# Patient Record
Sex: MALE | Race: WHITE
[De-identification: names, ages, dates, MRNs, and addresses within clinical notes are randomized per-mention and may not be internally consistent; named-entity substitution may affect disease eponyms.]

---

## 2017-05-26 ENCOUNTER — HOSPITAL ENCOUNTER (EMERGENCY)
Dept: HOSPITAL 76 - ED | Age: 47
Discharge: HOME | End: 2017-05-26
Payer: MEDICAID

## 2017-05-26 VITALS — SYSTOLIC BLOOD PRESSURE: 130 MMHG | DIASTOLIC BLOOD PRESSURE: 81 MMHG

## 2017-05-26 DIAGNOSIS — S82.62XA: Primary | ICD-10-CM
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
## 2017-05-26 PROCEDURE — 73610 X-RAY EXAM OF ANKLE: CPT

## 2017-05-26 PROCEDURE — 99283 EMERGENCY DEPT VISIT LOW MDM: CPT

## 2017-05-26 PROCEDURE — 29505 APPLICATION LONG LEG SPLINT: CPT

## 2017-05-26 NOTE — XRAY REPORT
EXAM:

LEFT ANKLE RADIOGRAPHY

 

EXAM DATE: 5/26/2017 08:03 PM.

 

CLINICAL HISTORY: Lateral pain and edema after a fall.

 

COMPARISON: None.

 

TECHNIQUE: 3 views.

 

FINDINGS: 

Bones: Acute transverse fracture 5 mm superior to the inferior most aspect of the lateral malleolus w
ith 2 mm separation of the fracture fragments.

 

Joints: Moderate ankle effusion. Ankle mortise intact.

 

However, there appears to be asymmetric widening of the subtalar joint, greatest on the right side. H
owever, no appreciable adjacent edema to such.

Large osteophyte off the calcaneal insertion plantar fascia.

 

Soft Tissues: Moderate edema over the lateral malleolar flake fracture.

 

IMPRESSION: 

1. Lateral malleolar flake fracture. 

2. Moderate ankle effusion. 

3. Possible diastases of the medial subtalar joint, correlate clinically.

 

[facility]

Referring Provider Line: [phone]

 

SITE ID: [id number]

## 2017-05-26 NOTE — ED PHYSICIAN DOCUMENTATION
PD HPI LOWER EXT INJURY





- Stated complaint


Stated Complaint: L ANKLE INJURY





- Chief complaint


Chief Complaint: Ext Problem





- History obtained from


History obtained from: Patient





- History of Present Illness


PD HPI LOW EXT INJURY LOCATION: Left, Ankle


Type of injury: Twist


Where injury occurred: Home


Timing - onset: Enter  time (17:00), Today


Timing - details: Abrupt onset


Pain level now: 6


Improved by: Rest


Worsened by: Moving, Palpating


Associated symptoms: Swelling.  No: Weakness, Numbness, Tingling


Similar symptoms before: Has not had sx before


Recently seen: Not recently seen





- Additional information


Additional information: 





while doing yardwork this afternoon, patient stepped into a hole, twisted left 

ankle, heard and felt "popping" and had sudden onset left ankle pain, lateral 

aspect.





Review of Systems


Musculoskeletal: reports: Joint pain, Joint swelling, Pain with weight bearing


Neurologic: denies: Focal weakness, Numbness





PD PAST MEDICAL HISTORY





- Past Medical History


Past Medical History: No





- Past Surgical History


Past Surgical History: No





- Present Medications


Home Medications: 


 Ambulatory Orders











 Medication  Instructions  Recorded  Confirmed


 


Hydrocodone/Acetaminophen 1 - 2 each PO Q6HR PRN #14 tablet 05/26/17 





[Hydrocodon-Acetaminophen 5-325]   














- Allergies


Allergies/Adverse Reactions: 


 Allergies











Allergy/AdvReac Type Severity Reaction Status Date / Time


 


No Known Drug Allergies Allergy   Verified 05/26/17 19:28














- Social History


Does the pt smoke?: No


Smoking Status: Never smoker


Does the pt drink ETOH?: No


Does the pt have substance abuse?: No





- Immunizations


Immunizations are current?: Yes





- POLST


Patient has POLST: No





PD ED PE NORMAL





- Vitals


Vital signs reviewed: Yes





- General


General: Alert and oriented X 3, No acute distress (NAD at rest, appears to be 

in pain with movement/palpation of left ankle), Well developed/nourished





- Derm


Derm: Normal color, Warm and dry





- Neuro


Neuro: No motor deficit, No sensory deficit





PD ED PE EXPANDED





- Extremities


Extremities: Tenderness, Limited ROM, Swelling, Left ankle (lateral malleolus), 

Other (2+ DP/PT pulses (left), NVI distally )





Results





- Vitals


Vitals: 


 Vital Signs - 24 hr











  05/26/17





  19:24


 


Temperature 36.4 C L


 


Heart Rate 60


 


Respiratory 17





Rate 


 


Blood Pressure 130/81 H


 


O2 Saturation 96








 Oxygen











O2 Source                      Room air

















- Rads (name of study)


  ** xrays left ankle


Radiology: Prelim report reviewed, See rad report





Procedures





- Splint (location)


  ** Lower extremity left


Splint applied by: Tech


Type of splint: Fiberglass, Long leg, Posterior, Stirrup


Other: Patient tolerated well, No complications, Neurovascular intact, Good 

alignment, Crutches provided





PD MEDICAL DECISION MAKING





- ED course


Complexity details: reviewed results, re-evaluated patient, considered 

differential, d/w patient





Departure





- Departure


Disposition: 01 Home, Self Care


Clinical Impression: 


 Ankle fracture, left


Condition: Good


Instructions:  ED Crutch Walking, ED Splint Care Fiberglass, ED Fx Ankle 

Lateral Malleolus


Follow-Up: 


Tam Goodman MD [Provider Admit Priv/Credential] -  (Call to arrange for next 

available appointment)


Prescriptions: 


Hydrocodone/Acetaminophen [Hydrocodon-Acetaminophen 5-325] 1 - 2 each PO Q6HR 

PRN #14 tablet


 PRN Reason: Pain


Discharge Date/Time: 05/26/17 21:41

## 2017-05-26 NOTE — XRAY PRELIMINARY REPORT
Accession: [id number]

Exam: XR Ankle 3 View LT

 

IMPRESSION: 

1. Lateral malleolar flake fracture. 

2. Moderate ankle effusion. 

3. Possible diastases of the medial subtalar joint, correlate clinically.

 

[facility]

 

SITE ID: [id number]

## 2020-02-17 ENCOUNTER — HOSPITAL ENCOUNTER (OUTPATIENT)
Dept: HOSPITAL 76 - LAB.R | Age: 50
Discharge: HOME | End: 2020-02-17
Attending: PHYSICIAN ASSISTANT
Payer: MEDICAID

## 2020-02-17 DIAGNOSIS — Z00.00: Primary | ICD-10-CM

## 2020-02-17 LAB — T VAGINALIS RRNA GENITAL QL PROBE: NEGATIVE

## 2020-02-17 PROCEDURE — 87591 N.GONORRHOEAE DNA AMP PROB: CPT

## 2020-02-17 PROCEDURE — 87661 TRICHOMONAS VAGINALIS AMPLIF: CPT

## 2020-02-17 PROCEDURE — 87491 CHLMYD TRACH DNA AMP PROBE: CPT

## 2020-12-08 ENCOUNTER — HOSPITAL ENCOUNTER (OUTPATIENT)
Dept: HOSPITAL 76 - LAB.R | Age: 50
Discharge: HOME | End: 2020-12-08
Attending: PHYSICIAN ASSISTANT
Payer: MEDICAID

## 2020-12-08 DIAGNOSIS — Z20.828: ICD-10-CM

## 2020-12-08 DIAGNOSIS — R52: Primary | ICD-10-CM

## 2022-09-14 ENCOUNTER — HOSPITAL ENCOUNTER (OUTPATIENT)
Dept: HOSPITAL 76 - LAB.S | Age: 52
Discharge: HOME | End: 2022-09-14
Attending: PHYSICIAN ASSISTANT
Payer: MEDICAID

## 2022-09-14 DIAGNOSIS — Z00.00: Primary | ICD-10-CM

## 2022-09-14 DIAGNOSIS — Z12.5: ICD-10-CM

## 2022-09-14 LAB
ALBUMIN DIAFP-MCNC: 4.2 G/DL (ref 3.2–5.5)
ALBUMIN/GLOB SERPL: 1.6 {RATIO} (ref 1–2.2)
ALP SERPL-CCNC: 16 IU/L (ref 42–121)
ALT SERPL W P-5'-P-CCNC: 23 IU/L (ref 10–60)
ANION GAP SERPL CALCULATED.4IONS-SCNC: 7 MMOL/L (ref 6–13)
AST SERPL W P-5'-P-CCNC: 18 IU/L (ref 10–42)
BASOPHILS NFR BLD AUTO: 0.1 10^3/UL (ref 0–0.1)
BASOPHILS NFR BLD AUTO: 0.7 %
BILIRUB BLD-MCNC: 1.6 MG/DL (ref 0.2–1)
BUN SERPL-MCNC: 18 MG/DL (ref 6–20)
CALCIUM UR-MCNC: 9.5 MG/DL (ref 8.5–10.3)
CHLORIDE SERPL-SCNC: 101 MMOL/L (ref 101–111)
CHOLEST SERPL-MCNC: 225 MG/DL
CO2 SERPL-SCNC: 29 MMOL/L (ref 21–32)
CREAT SERPLBLD-SCNC: 0.9 MG/DL (ref 0.6–1.2)
EOSINOPHIL # BLD AUTO: 0.2 10^3/UL (ref 0–0.7)
EOSINOPHIL NFR BLD AUTO: 1.8 %
ERYTHROCYTE [DISTWIDTH] IN BLOOD BY AUTOMATED COUNT: 12 % (ref 12–15)
GFRSERPLBLD MDRD-ARVRAT: 88 ML/MIN/{1.73_M2} (ref 89–?)
GLOBULIN SER-MCNC: 2.7 G/DL (ref 2.1–4.2)
GLUCOSE SERPL-MCNC: 100 MG/DL (ref 70–100)
HCT VFR BLD AUTO: 44.5 % (ref 42–52)
HDLC SERPL-MCNC: 66 MG/DL
HDLC SERPL: 3.4 {RATIO} (ref ?–5)
HGB UR QL STRIP: 15.2 G/DL (ref 14–18)
LDLC SERPL CALC-MCNC: 146 MG/DL
LDLC/HDLC SERPL: 2.2 {RATIO} (ref ?–3.6)
LYMPHOCYTES # SPEC AUTO: 1.8 10^3/UL (ref 1.5–3.5)
LYMPHOCYTES NFR BLD AUTO: 21.5 %
MCH RBC QN AUTO: 32.3 PG (ref 27–31)
MCHC RBC AUTO-ENTMCNC: 34.2 G/DL (ref 32–36)
MCV RBC AUTO: 94.7 FL (ref 80–94)
MONOCYTES # BLD AUTO: 0.7 10^3/UL (ref 0–1)
MONOCYTES NFR BLD AUTO: 8.6 %
NEUTROPHILS # BLD AUTO: 5.5 10^3/UL (ref 1.5–6.6)
NEUTROPHILS # SNV AUTO: 8.2 X10^3/UL (ref 4.8–10.8)
NEUTROPHILS NFR BLD AUTO: 67.3 %
NRBC # BLD AUTO: 0 /100WBC
NRBC # BLD AUTO: 0 X10^3/UL
PDW BLD AUTO: 9.3 FL (ref 7.4–11.4)
PLATELET # BLD: 269 10^3/UL (ref 130–450)
POTASSIUM SERPL-SCNC: 4.2 MMOL/L (ref 3.5–5)
PROT SPEC-MCNC: 6.9 G/DL (ref 6.7–8.2)
RBC MAR: 4.7 10^6/UL (ref 4.7–6.1)
SODIUM SERPLBLD-SCNC: 137 MMOL/L (ref 135–145)
TRIGL P FAST SERPL-MCNC: 66 MG/DL
TSH SERPL-ACNC: 3.66 UIU/ML (ref 0.34–5.6)
VLDLC SERPL-SCNC: 13 MG/DL

## 2022-09-14 PROCEDURE — 83721 ASSAY OF BLOOD LIPOPROTEIN: CPT

## 2022-09-14 PROCEDURE — 80053 COMPREHEN METABOLIC PANEL: CPT

## 2022-09-14 PROCEDURE — 36415 COLL VENOUS BLD VENIPUNCTURE: CPT

## 2022-09-14 PROCEDURE — 84443 ASSAY THYROID STIM HORMONE: CPT

## 2022-09-14 PROCEDURE — 85025 COMPLETE CBC W/AUTO DIFF WBC: CPT

## 2022-09-14 PROCEDURE — 84153 ASSAY OF PSA TOTAL: CPT

## 2022-09-14 PROCEDURE — 80061 LIPID PANEL: CPT

## 2024-09-16 ENCOUNTER — HOSPITAL ENCOUNTER (OUTPATIENT)
Dept: HOSPITAL 76 - LAB.S | Age: 54
Discharge: HOME | End: 2024-09-16
Attending: PHYSICIAN ASSISTANT
Payer: COMMERCIAL

## 2024-09-16 DIAGNOSIS — Z00.00: Primary | ICD-10-CM

## 2024-09-16 DIAGNOSIS — Z12.5: ICD-10-CM

## 2024-09-16 LAB
ALBUMIN DIAFP-MCNC: 4.2 G/DL (ref 3.2–5.5)
ALBUMIN/GLOB SERPL: 1.6 {RATIO} (ref 1–2.2)
ALP SERPL-CCNC: 14 IU/L (ref 42–121)
ALT SERPL W P-5'-P-CCNC: 17 IU/L (ref 10–60)
ANION GAP SERPL CALCULATED.4IONS-SCNC: 5 MMOL/L (ref 6–13)
AST SERPL W P-5'-P-CCNC: 16 IU/L (ref 10–42)
BASOPHILS NFR BLD AUTO: 0.1 10^3/UL (ref 0–0.1)
BASOPHILS NFR BLD AUTO: 0.7 %
BILIRUB BLD-MCNC: 1 MG/DL (ref 0.2–1)
BUN SERPL-MCNC: 16 MG/DL (ref 6–20)
CALCIUM UR-MCNC: 9.5 MG/DL (ref 8.5–10.3)
CHLORIDE SERPL-SCNC: 106 MMOL/L (ref 101–111)
CHOLEST SERPL-MCNC: 179 MG/DL
CO2 SERPL-SCNC: 27 MMOL/L (ref 21–32)
CREAT SERPLBLD-SCNC: 0.9 MG/DL (ref 0.6–1.3)
EOSINOPHIL # BLD AUTO: 0.1 10^3/UL (ref 0–0.7)
EOSINOPHIL NFR BLD AUTO: 1.4 %
ERYTHROCYTE [DISTWIDTH] IN BLOOD BY AUTOMATED COUNT: 12.6 % (ref 12–15)
GFRSERPLBLD MDRD-ARVRAT: 88 ML/MIN/{1.73_M2} (ref 89–?)
GLOBULIN SER-MCNC: 2.7 G/DL (ref 2.1–4.2)
GLUCOSE SERPL-MCNC: 113 MG/DL (ref 74–104)
HCT VFR BLD AUTO: 44.6 % (ref 42–52)
HDLC SERPL-MCNC: 60 MG/DL
HDLC SERPL: 3 {RATIO} (ref ?–5)
HGB UR QL STRIP: 15.2 G/DL (ref 14–18)
LDLC SERPL CALC-MCNC: 103 MG/DL
LDLC/HDLC SERPL: 1.7 {RATIO} (ref ?–3.6)
LYMPHOCYTES # SPEC AUTO: 1.6 10^3/UL (ref 1.5–3.5)
LYMPHOCYTES NFR BLD AUTO: 20.1 %
MCH RBC QN AUTO: 31.2 PG (ref 27–31)
MCHC RBC AUTO-ENTMCNC: 34.1 G/DL (ref 32–36)
MCV RBC AUTO: 91.6 FL (ref 80–94)
MONOCYTES # BLD AUTO: 0.8 10^3/UL (ref 0–1)
MONOCYTES NFR BLD AUTO: 9.6 %
NEUTROPHILS # BLD AUTO: 5.5 10^3/UL (ref 1.5–6.6)
NEUTROPHILS # SNV AUTO: 8.1 X10^3/UL (ref 4.8–10.8)
NEUTROPHILS NFR BLD AUTO: 67.8 %
NRBC # BLD AUTO: 0 /100WBC
NRBC # BLD AUTO: 0 X10^3/UL
PDW BLD AUTO: 9.3 FL (ref 7.4–11.4)
PLATELET # BLD: 299 10^3/UL (ref 130–450)
POTASSIUM SERPL-SCNC: 4.1 MMOL/L (ref 3.5–4.5)
PROT SPEC-MCNC: 6.9 G/DL (ref 6.4–8.9)
RBC MAR: 4.87 10^6/UL (ref 4.7–6.1)
SODIUM SERPLBLD-SCNC: 138 MMOL/L (ref 135–145)
TRIGL P FAST SERPL-MCNC: 80 MG/DL
TSH SERPL-ACNC: 2.92 UIU/ML (ref 0.34–5.6)
VLDLC SERPL-SCNC: 16 MG/DL

## 2024-09-16 PROCEDURE — 83721 ASSAY OF BLOOD LIPOPROTEIN: CPT

## 2024-09-16 PROCEDURE — 80061 LIPID PANEL: CPT

## 2024-09-16 PROCEDURE — 85025 COMPLETE CBC W/AUTO DIFF WBC: CPT

## 2024-09-16 PROCEDURE — 80053 COMPREHEN METABOLIC PANEL: CPT

## 2024-09-16 PROCEDURE — 84153 ASSAY OF PSA TOTAL: CPT

## 2024-09-16 PROCEDURE — 36415 COLL VENOUS BLD VENIPUNCTURE: CPT

## 2024-09-16 PROCEDURE — 84443 ASSAY THYROID STIM HORMONE: CPT
